# Patient Record
Sex: FEMALE | Race: WHITE | Employment: UNEMPLOYED | URBAN - METROPOLITAN AREA
[De-identification: names, ages, dates, MRNs, and addresses within clinical notes are randomized per-mention and may not be internally consistent; named-entity substitution may affect disease eponyms.]

---

## 2024-06-25 ENCOUNTER — HOSPITAL ENCOUNTER (EMERGENCY)
Facility: HOSPITAL | Age: 24
Discharge: HOME | End: 2024-06-25
Attending: EMERGENCY MEDICINE

## 2024-06-25 VITALS
BODY MASS INDEX: 26.67 KG/M2 | RESPIRATION RATE: 16 BRPM | TEMPERATURE: 99 F | HEIGHT: 68 IN | SYSTOLIC BLOOD PRESSURE: 148 MMHG | OXYGEN SATURATION: 96 % | WEIGHT: 176 LBS | HEART RATE: 91 BPM | DIASTOLIC BLOOD PRESSURE: 86 MMHG

## 2024-06-25 DIAGNOSIS — H91.92 HEARING DIFFICULTY OF LEFT EAR: ICD-10-CM

## 2024-06-25 DIAGNOSIS — H61.23 BILATERAL IMPACTED CERUMEN: Primary | ICD-10-CM

## 2024-06-25 PROCEDURE — 99283 EMERGENCY DEPT VISIT LOW MDM: CPT

## 2024-06-25 PROCEDURE — 69210 REMOVE IMPACTED EAR WAX UNI: CPT

## 2024-06-25 PROCEDURE — 99282 EMERGENCY DEPT VISIT SF MDM: CPT

## 2024-06-25 RX ORDER — PSEUDOEPHEDRINE HCL 120 MG/1
120 TABLET, FILM COATED, EXTENDED RELEASE ORAL EVERY 12 HOURS PRN
Qty: 10 TABLET | Refills: 0 | Status: SHIPPED | OUTPATIENT
Start: 2024-06-25 | End: 2024-07-25

## 2024-06-25 RX ORDER — FLUTICASONE PROPIONATE 50 MCG
2 SPRAY, SUSPENSION (ML) NASAL DAILY
Qty: 16 G | Refills: 0 | Status: SHIPPED | OUTPATIENT
Start: 2024-06-25 | End: 2024-07-25

## 2024-06-25 NOTE — ED PROVIDER NOTES
Patient Seen in: [facility] Emergency Department      History     Chief Complaint   Patient presents with    Ear Pain     Stated Complaint: Ear Problem Pain    Subjective:   HPI    24-year-old female presents for evaluation for difficulty hearing from her left ear.  Patient reports that last week she developed some sinus congestion and sore throat and then over the past 2 days has had trouble hearing from her left ear.  She did have some discomfort in that ear.  She denies any fevers.    Objective:   History reviewed. No pertinent past medical history.           History reviewed. No pertinent surgical history.             Social History     Socioeconomic History    Marital status: Single   Tobacco Use    Smoking status: Never    Smokeless tobacco: Never   Vaping Use    Vaping status: Never Used   Substance and Sexual Activity    Alcohol use: Not Currently     Comment: socially    Drug use: Never              Review of Systems    Positive for stated Chief Complaint: Ear Pain    Other systems are as noted in HPI.  Constitutional and vital signs reviewed.      All other systems reviewed and negative except as noted above.    Physical Exam     ED Triage Vitals [06/25/24 1329]   /86   Pulse 91   Resp 16   Temp 98.9 °F (37.2 °C)   Temp src Oral   SpO2 96 %   O2 Device None (Room air)       Current Vitals:   Vital Signs  BP: 148/86  Pulse: 91  Resp: 16  Temp: 98.9 °F (37.2 °C)  Temp src: Oral    Oxygen Therapy  SpO2: 96 %  O2 Device: None (Room air)            Physical Exam  Vitals and nursing note reviewed.   Constitutional:       Appearance: Normal appearance. She is well-developed. She is not ill-appearing or diaphoretic.   HENT:      Head: Normocephalic and atraumatic.      Right Ear: External ear normal. There is impacted cerumen.      Left Ear: External ear normal. There is impacted cerumen.      Nose: Nose normal. No nasal deformity.      Mouth/Throat:      Lips: Pink.      Pharynx: Oropharynx is  clear. Uvula midline. No posterior oropharyngeal erythema or uvula swelling.      Tonsils: No tonsillar exudate or tonsillar abscesses.   Eyes:      General: Lids are normal.      Extraocular Movements: Extraocular movements intact.   Pulmonary:      Effort: Pulmonary effort is normal. No respiratory distress.   Musculoskeletal:         General: No deformity. Normal range of motion.   Skin:     General: Skin is dry.      Coloration: Skin is not cyanotic or pale.   Neurological:      General: No focal deficit present.      Mental Status: She is alert and oriented to person, place, and time.      Gait: Gait is intact.   Psychiatric:         Attention and Perception: Attention normal.         Mood and Affect: Mood normal.         Speech: Speech normal.               ED Course   Labs Reviewed - No data to display                   MDM        Cerumen Disimpaction Note:  Indication: Cerumen Impaction  Informed consent was obtained with risks, benefits and alternative discussed  Technique: Patient's bilateral canal(s) were disimpacted using a curette and irrigation with warm water and hydrogen peroxide  Patient was re-evaluated after the procedure the the tympanic membranes remained intact.   There was complete removal of the cerumen   Patient tolerated procedure without difficulty.                                   Medical Decision Making  Differential diagnosis includes but is not limited to otitis media, otitis externa, cerumen impaction, etc.    Exam without evidence for otitis media.  Patient's cerumen was removed, she does have some fluid behind the ear and will be started on some Flonase and pseudoephedrine.    Medical Record Review: I personally reviewed available prior medical records for any recent pertinent discharge summaries, testing, and procedures, and reviewed those reports.    Complicating factors: The patient  has no past medical history on file. and  has no past surgical history on file. that contribute  to the medical complexity of this ED evaluation.     Clinical impression as well as any lab results and radiology findings were discussed with the patient and/or caregiver. I personally reviewed all laboratory results and radiology images myself. Patient is advised to follow up with PCP for reevaluation. I provided discharge instructions and return precautions. Patient and/or caregiver voices understanding and agreement with the treatment plan. All questions were addressed and answered.         Problems Addressed:  Bilateral impacted cerumen: acute illness or injury        Disposition and Plan     Clinical Impression:  1. Bilateral impacted cerumen    2. Hearing difficulty of left ear         Disposition:  Discharge  6/25/2024  3:44 pm    Follow-up:  Jennifer Nance MD  [address] [place] [address] [id number] [phone]    Schedule an appointment as soon as possible for a visit  Primary care follow up if you don't have a PCP          Medications Prescribed:  Discharge Medication List as of 6/25/2024  3:46 PM        START taking these medications    Details   fluticasone propionate 50 MCG/ACT Nasal Suspension 2 sprays by Nasal route daily., Normal, Disp-16 g, R-0      pseudoephedrine  MG Oral Tablet 12 Hr Take 1 tablet (120 mg total) by mouth every 12 (twelve) hours as needed for congestion., Normal, Disp-10 tablet, R-0

## 2024-06-25 NOTE — ED INITIAL ASSESSMENT (HPI)
Pt ambulatory to ED A&O x 4 w/ c/o L ear pain that started 2 days ago.  Pt reporting feeling like her ear was, \"clogged\" starting yesterday.  Pt denies any drainage from ear or fevers.

## 2024-07-05 ENCOUNTER — OFFICE VISIT (OUTPATIENT)
Dept: INTERNAL MEDICINE CLINIC | Facility: CLINIC | Age: 24
End: 2024-07-05

## 2024-07-05 VITALS
HEART RATE: 73 BPM | SYSTOLIC BLOOD PRESSURE: 131 MMHG | DIASTOLIC BLOOD PRESSURE: 85 MMHG | BODY MASS INDEX: 27.58 KG/M2 | OXYGEN SATURATION: 97 % | HEIGHT: 68 IN | WEIGHT: 182 LBS

## 2024-07-05 DIAGNOSIS — H69.93 DYSFUNCTION OF BOTH EUSTACHIAN TUBES: Primary | ICD-10-CM

## 2024-07-05 PROCEDURE — 99203 OFFICE O/P NEW LOW 30 MIN: CPT

## 2024-07-05 RX ORDER — PREDNISONE 20 MG/1
20 TABLET ORAL DAILY
Qty: 5 TABLET | Refills: 0 | Status: SHIPPED | OUTPATIENT
Start: 2024-07-05 | End: 2024-07-10

## 2024-07-05 NOTE — PROGRESS NOTES
Subjective:   Vianney Hassan is a 24 year old female who presents for ER F/U (Bilateral impacted cerumen)     ER f/u for bilateral cerumen impaction and middle ear effusion  Started on flonase and sudafed    Still having trouble hearing in both ears but worse in the left ear   Started in the left and then moved to the right   No ear pain or tinnitus   No dizziness   Nose is still a little congested   No cough, no fever or chills   Thought it was allergies at first     No history of ear problems   Had something similar happen a few months ago but it didn't last long   No recent airplane travel or swimming     History/Other:    Chief Complaint Reviewed and Verified  Nursing Notes Reviewed and   Verified  Tobacco Reviewed  Medications Reviewed  OB Status Reviewed         Tobacco:  She has never smoked tobacco.    Current Outpatient Medications   Medication Sig Dispense Refill    predniSONE 20 MG Oral Tab Take 1 tablet (20 mg total) by mouth daily for 5 days. 5 tablet 0    fluticasone propionate 50 MCG/ACT Nasal Suspension 2 sprays by Nasal route daily. (Patient not taking: Reported on 7/5/2024) 16 g 0    pseudoephedrine  MG Oral Tablet 12 Hr Take 1 tablet (120 mg total) by mouth every 12 (twelve) hours as needed for congestion. (Patient not taking: Reported on 7/5/2024) 10 tablet 0       Review of Systems:  Review of Systems  10 point review of systems otherwise negative with the exception of HPI and assessment and plan    Objective:   /85   Pulse 73   Ht 5' 8\" (1.727 m)   Wt 182 lb (82.6 kg)   LMP 06/07/2024 (Exact Date)   SpO2 97%   BMI 27.67 kg/m²  Estimated body mass index is 27.67 kg/m² as calculated from the following:    Height as of this encounter: 5' 8\" (1.727 m).    Weight as of this encounter: 182 lb (82.6 kg).  Physical Exam  Vitals reviewed.   Constitutional:       General: She is not in acute distress.     Appearance: Normal appearance. She is well-developed.   HENT:      Right  Ear: No mastoid tenderness. Tympanic membrane is erythematous (mild).      Left Ear: Tympanic membrane normal. No mastoid tenderness.   Cardiovascular:      Rate and Rhythm: Normal rate.   Pulmonary:      Effort: Pulmonary effort is normal.   Skin:     General: Skin is warm and dry.   Neurological:      Mental Status: She is alert and oriented to person, place, and time.       Assessment & Plan:   1. Dysfunction of both eustachian tubes (Primary)  -     [facility]  -     predniSONE; Take 1 tablet (20 mg total) by mouth daily for 5 days.  Dispense: 5 tablet; Refill: 0  Continue flonase  See ENT if not improving    YOJANA Banda, 7/5/2024, 3:34 PM